# Patient Record
Sex: MALE | Race: WHITE | HISPANIC OR LATINO | Employment: UNEMPLOYED | URBAN - METROPOLITAN AREA
[De-identification: names, ages, dates, MRNs, and addresses within clinical notes are randomized per-mention and may not be internally consistent; named-entity substitution may affect disease eponyms.]

---
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
## 2022-12-20 ENCOUNTER — HOSPITAL ENCOUNTER (EMERGENCY)
Facility: HOSPITAL | Age: 22
Discharge: HOME | End: 2022-12-20
Attending: EMERGENCY MEDICINE
Payer: COMMERCIAL

## 2022-12-20 VITALS
HEIGHT: 70 IN | OXYGEN SATURATION: 98 % | BODY MASS INDEX: 37.22 KG/M2 | HEART RATE: 58 BPM | RESPIRATION RATE: 16 BRPM | DIASTOLIC BLOOD PRESSURE: 54 MMHG | WEIGHT: 260 LBS | TEMPERATURE: 99 F | SYSTOLIC BLOOD PRESSURE: 110 MMHG

## 2022-12-20 DIAGNOSIS — R11.2 NAUSEA AND VOMITING, UNSPECIFIED VOMITING TYPE: Primary | ICD-10-CM

## 2022-12-20 LAB
BILIRUB UR QL STRIP: NEGATIVE
BUN SERPL-MCNC: 12 MG/DL (ref 6–30)
CHLORIDE SERPL-SCNC: 104 MMOL/L (ref 95–110)
CLARITY UR REFRACT.AUTO: CLEAR
COLOR UR AUTO: YELLOW
CREAT SERPL-MCNC: 0.8 MG/DL (ref 0.5–1.4)
GLUCOSE SERPL-MCNC: 95 MG/DL (ref 70–110)
GLUCOSE UR QL STRIP: NEGATIVE
HCT VFR BLD CALC: 50 %PCV (ref 36–54)
HCV AB SERPL QL IA: NORMAL
HGB UR QL STRIP: NEGATIVE
HIV 1+2 AB+HIV1 P24 AG SERPL QL IA: NORMAL
INFLUENZA A, MOLECULAR: NOT DETECTED
INFLUENZA B, MOLECULAR: NOT DETECTED
KETONES UR QL STRIP: NEGATIVE
LEUKOCYTE ESTERASE UR QL STRIP: NEGATIVE
NITRITE UR QL STRIP: NEGATIVE
PH UR STRIP: 8 [PH] (ref 5–8)
POC IONIZED CALCIUM: 1.24 MMOL/L (ref 1.06–1.42)
POC TCO2 (MEASURED): 25 MMOL/L (ref 23–29)
POTASSIUM BLD-SCNC: 4.2 MMOL/L (ref 3.5–5.1)
PROT UR QL STRIP: NEGATIVE
RSV AG BY MOLECULAR METHOD: NOT DETECTED
SAMPLE: NORMAL
SARS-COV-2 RNA RESP QL NAA+PROBE: NOT DETECTED
SODIUM BLD-SCNC: 140 MMOL/L (ref 136–145)
SP GR UR STRIP: 1.02 (ref 1–1.03)
URN SPEC COLLECT METH UR: NORMAL

## 2022-12-20 PROCEDURE — 99284 EMERGENCY DEPT VISIT MOD MDM: CPT | Mod: 25

## 2022-12-20 PROCEDURE — 99284 EMERGENCY DEPT VISIT MOD MDM: CPT | Mod: CS,,, | Performed by: PHYSICIAN ASSISTANT

## 2022-12-20 PROCEDURE — 96374 THER/PROPH/DIAG INJ IV PUSH: CPT

## 2022-12-20 PROCEDURE — 96361 HYDRATE IV INFUSION ADD-ON: CPT

## 2022-12-20 PROCEDURE — 82565 ASSAY OF CREATININE: CPT

## 2022-12-20 PROCEDURE — 25000003 PHARM REV CODE 250: Performed by: PHYSICIAN ASSISTANT

## 2022-12-20 PROCEDURE — 82962 GLUCOSE BLOOD TEST: CPT

## 2022-12-20 PROCEDURE — 81003 URINALYSIS AUTO W/O SCOPE: CPT | Performed by: PHYSICIAN ASSISTANT

## 2022-12-20 PROCEDURE — 0241U SARS-COV2 (COVID) WITH FLU/RSV BY PCR: CPT | Performed by: PHYSICIAN ASSISTANT

## 2022-12-20 PROCEDURE — 63600175 PHARM REV CODE 636 W HCPCS: Performed by: PHYSICIAN ASSISTANT

## 2022-12-20 PROCEDURE — 99284 PR EMERGENCY DEPT VISIT,LEVEL IV: ICD-10-PCS | Mod: CS,,, | Performed by: PHYSICIAN ASSISTANT

## 2022-12-20 PROCEDURE — 87389 HIV-1 AG W/HIV-1&-2 AB AG IA: CPT | Performed by: EMERGENCY MEDICINE

## 2022-12-20 PROCEDURE — 86803 HEPATITIS C AB TEST: CPT | Performed by: EMERGENCY MEDICINE

## 2022-12-20 RX ORDER — ONDANSETRON 2 MG/ML
4 INJECTION INTRAMUSCULAR; INTRAVENOUS
Status: COMPLETED | OUTPATIENT
Start: 2022-12-20 | End: 2022-12-20

## 2022-12-20 RX ORDER — ONDANSETRON 4 MG/1
4 TABLET, ORALLY DISINTEGRATING ORAL EVERY 8 HOURS PRN
Qty: 12 TABLET | Refills: 0 | Status: SHIPPED | OUTPATIENT
Start: 2022-12-20

## 2022-12-20 RX ADMIN — ONDANSETRON 4 MG: 2 INJECTION INTRAMUSCULAR; INTRAVENOUS at 10:12

## 2022-12-20 RX ADMIN — SODIUM CHLORIDE 1000 ML: 0.9 INJECTION, SOLUTION INTRAVENOUS at 10:12

## 2022-12-20 NOTE — ED NOTES
Pt started at 2am with headache, body aches, chills, abd pain with tenderness to right abd region.    LOC: The patient is awake, alert and aware of environment with an appropriate affect, the patient is oriented x 3 and speaking appropriately.  APPEARANCE: Patient resting comfortably and in no acute distress, patient is clean and well groomed, patient's clothing is properly fastened.  SKIN: The skin is warm and dry, color consistent with ethnicity, patient has normal skin turgor and moist mucus membranes, skin intact, no breakdown or bruising noted.  MUSCULOSKELETAL: Patient moving all extremities spontaneously, no obvious swelling or deformities noted.  RESPIRATORY: Airway is open and patent, respirations are spontaneous, patient has a normal effort and rate, no accessory muscle use noted.  ABDOMEN: Soft and + tender to palpation, no distention noted.

## 2022-12-20 NOTE — ED NOTES
Pt ambulatory to restroom at this time, steady unassisted gait noted. Aware of need for urine sample.

## 2022-12-20 NOTE — ED PROVIDER NOTES
Encounter Date: 12/20/2022       History     Chief Complaint   Patient presents with    Vomiting    Headache     22-year-old male with no known medical history presents to the ED complaining of nausea and vomiting that started this morning around 2:00 a.m..  He reports generalized abdominal pain/discomfort, lightheadedness.  He initially had a headache but that has since significantly improved.  No known sick contacts.  He is not vaccinated for COVID or influenza.  He denies fever, chest pain, shortness breath, diarrhea, dysuria, URI symptoms.    The history is provided by the patient. The history is limited by a language barrier (Kittitian). A  was used (#[id number]).   Review of patient's allergies indicates:  No Known Allergies  No past medical history on file.  No past surgical history on file.  No family history on file.     Review of Systems   Constitutional:  Positive for chills. Negative for fever.   HENT:  Negative for congestion and sore throat.    Respiratory:  Negative for cough and shortness of breath.    Cardiovascular:  Negative for chest pain.   Gastrointestinal:  Positive for abdominal pain, nausea and vomiting. Negative for constipation and diarrhea.   Genitourinary:  Negative for dysuria and hematuria.   Musculoskeletal:  Negative for back pain.   Skin:  Negative for rash.   Neurological:  Positive for light-headedness and headaches. Negative for weakness and numbness.   Psychiatric/Behavioral:  Negative for confusion.      Physical Exam     Initial Vitals [12/20/22 0935]   BP Pulse Resp Temp SpO2   137/78 71 16 98.6 °F (37 °C) 100 %      MAP       --         Physical Exam    Nursing note and vitals reviewed.  Constitutional: He appears well-developed and well-nourished. He is not diaphoretic. No distress.   HENT:   Head: Normocephalic and atraumatic.   Neck: Neck supple.   Normal range of motion.  Cardiovascular:  Normal rate, regular rhythm and normal heart sounds.     Exam reveals  no gallop and no friction rub.       No murmur heard.  Pulmonary/Chest: Breath sounds normal. He has no wheezes. He has no rhonchi. He has no rales.   Abdominal: Abdomen is soft. Bowel sounds are normal. There is abdominal tenderness (generalized, minimal).   No right CVA tenderness.  No left CVA tenderness. There is no rebound and no guarding.   Musculoskeletal:         General: Normal range of motion.      Cervical back: Normal range of motion and neck supple.     Neurological: He is alert and oriented to person, place, and time.   Skin: Skin is warm and dry. No rash noted. No erythema.   Psychiatric: He has a normal mood and affect.       ED Course   Procedures  Labs Reviewed   URINALYSIS, REFLEX TO URINE CULTURE    Narrative:     Specimen Source->Urine   SARS-COV2 (COVID) WITH FLU/RSV BY PCR   HIV 1 / 2 ANTIBODY    Narrative:     Release to patient->Immediate   HEPATITIS C ANTIBODY    Narrative:     Release to patient->Immediate   ISTAT PROCEDURE          Imaging Results    None          Medications   sodium chloride 0.9% bolus 1,000 mL 1,000 mL (0 mLs Intravenous Stopped 12/20/22 1113)   ondansetron injection 4 mg (4 mg Intravenous Given 12/20/22 1013)     Medical Decision Making:   History:   Old Medical Records: I decided to obtain old medical records.  Clinical Tests:   Lab Tests: Ordered and Reviewed     APC / Resident Notes:   22-year-old male with no known medical history presents to the ED complaining of nausea and vomiting that started this morning around 2:00 a.m..  Vital signs stable.  Well-appearing.  Abdomen is soft, generalized tenderness to palpation.  No focal abdominal tenderness.  Regular rate and rhythm.  Lungs are clear.  No CVA tenderness.  Differential diagnosis includes but isn't limited to dehydration, acute kidney injury, electrolyte abnormality, gastroenteritis, UTI, COVID, influenza.  Will get labs, give IV fluids and Zofran and reassess.    COVID/flu/RSV negative. Chem8 unremarkable.  UA with no infection.    Symptoms improved with IVF and zofran. Tolerated po challenge. I do not feel that he needs any further labs or imaging at this time. Stable for discharge.    He was discharged with a prescription for zofran.  He will follow up with his PCP.  Strict ED return precautions given.  All of the patient's questions were answered.  I reviewed the patient's chart and labs.                   Clinical Impression:   Final diagnoses:  [R11.2] Nausea and vomiting, unspecified vomiting type (Primary)        ED Disposition Condition    Discharge Stable          ED Prescriptions       Medication Sig Dispense Start Date End Date Auth. Provider    ondansetron (ZOFRAN-ODT) 4 MG TbDL Take 1 tablet (4 mg total) by mouth every 8 (eight) hours as needed (nausea). 12 tablet 12/20/2022 -- Amy Hu PA-C          Follow-up Information       Follow up With Specialties Details Why Contact Info Additional Information    [facility]   [address]  [facility] [id number] [phone] [facility] Please park in surface lot and check in at central registration desk             Amy Hu PA-C  12/20/22 0382

## 2022-12-20 NOTE — ED NOTES
I-STAT Chem-8+ Results:   Value Reference Range   Sodium 140 136-145 mmol/L   Potassium  4.2 3.5-5.1 mmol/L   Chloride 104  mmol/L   Ionized Calcium 1.24 1.06-1.42 mmol/L   CO2 (measured) 25 23-29 mmol/L   Glucose 95  mg/dL   BUN 12 6-30 mg/dL   Creatinine 0.8 0.5-1.4 mg/dL   Hematocrit 50 36-54%

## 2024-12-30 ENCOUNTER — HOSPITAL ENCOUNTER (EMERGENCY)
Facility: HOSPITAL | Age: 24
Discharge: HOME | End: 2024-12-31
Attending: STUDENT IN AN ORGANIZED HEALTH CARE EDUCATION/TRAINING PROGRAM
Payer: COMMERCIAL

## 2024-12-30 DIAGNOSIS — M79.89 LEG SWELLING: ICD-10-CM

## 2024-12-30 PROCEDURE — 63600175 PHARM REV CODE 636 W HCPCS: Performed by: STUDENT IN AN ORGANIZED HEALTH CARE EDUCATION/TRAINING PROGRAM

## 2024-12-30 PROCEDURE — 99284 EMERGENCY DEPT VISIT MOD MDM: CPT | Mod: 25

## 2024-12-30 PROCEDURE — 25000003 PHARM REV CODE 250: Performed by: STUDENT IN AN ORGANIZED HEALTH CARE EDUCATION/TRAINING PROGRAM

## 2024-12-30 PROCEDURE — 96372 THER/PROPH/DIAG INJ SC/IM: CPT | Performed by: STUDENT IN AN ORGANIZED HEALTH CARE EDUCATION/TRAINING PROGRAM

## 2024-12-30 RX ORDER — PREDNISONE 20 MG/1
60 TABLET ORAL
Status: COMPLETED | OUTPATIENT
Start: 2024-12-30 | End: 2024-12-30

## 2024-12-30 RX ORDER — CETIRIZINE HYDROCHLORIDE 10 MG/1
40 TABLET ORAL
Status: COMPLETED | OUTPATIENT
Start: 2024-12-30 | End: 2024-12-30

## 2024-12-30 RX ORDER — DIPHENHYDRAMINE HCL 50 MG
50 CAPSULE ORAL
Status: COMPLETED | OUTPATIENT
Start: 2024-12-30 | End: 2024-12-30

## 2024-12-30 RX ORDER — FUROSEMIDE 10 MG/ML
20 INJECTION INTRAMUSCULAR; INTRAVENOUS
Status: COMPLETED | OUTPATIENT
Start: 2024-12-30 | End: 2024-12-30

## 2024-12-30 RX ADMIN — DIPHENHYDRAMINE HYDROCHLORIDE 50 MG: 50 CAPSULE ORAL at 11:12

## 2024-12-30 RX ADMIN — PREDNISONE 60 MG: 20 TABLET ORAL at 11:12

## 2024-12-30 RX ADMIN — FUROSEMIDE 20 MG: 10 INJECTION, SOLUTION INTRAMUSCULAR; INTRAVENOUS at 11:12

## 2024-12-30 RX ADMIN — CETIRIZINE HYDROCHLORIDE 40 MG: 10 TABLET, FILM COATED ORAL at 11:12

## 2024-12-30 NOTE — Clinical Note
"Graham Piña" Won Ulrichquez was seen and treated in our emergency department on 12/30/2024.  He may return to work on 01/01/2025.       If you have any questions or concerns, please don't hesitate to call.       RN    "

## 2024-12-31 VITALS
OXYGEN SATURATION: 99 % | BODY MASS INDEX: 37.22 KG/M2 | RESPIRATION RATE: 16 BRPM | DIASTOLIC BLOOD PRESSURE: 81 MMHG | WEIGHT: 260 LBS | TEMPERATURE: 99 F | HEIGHT: 70 IN | HEART RATE: 63 BPM | SYSTOLIC BLOOD PRESSURE: 125 MMHG

## 2024-12-31 RX ORDER — CETIRIZINE HYDROCHLORIDE 10 MG/1
40 TABLET ORAL DAILY
Qty: 20 TABLET | Refills: 0 | Status: SHIPPED | OUTPATIENT
Start: 2024-12-31 | End: 2025-01-05

## 2024-12-31 RX ORDER — PREDNISONE 20 MG/1
40 TABLET ORAL DAILY
Qty: 4 TABLET | Refills: 0 | Status: SHIPPED | OUTPATIENT
Start: 2024-12-31 | End: 2025-01-02

## 2024-12-31 NOTE — DISCHARGE INSTRUCTIONS
Llame para programar avelino tabby con schumacher médico de atención primaria. Use los medicamentos según lo prescrito y regrese al departamento de emergencias si la condición empeora de alguna manera.

## 2024-12-31 NOTE — ED NOTES
#[id number] used    Patient states allergy to shrimp, having swollen feet this AM. States taking no medications for the allergy after eating shrimp. States sometimes has difficulty breathing with eating shrimp, no visible distress, no accessory muscle usage, no audible wheezing, able to tolerate secretions.     in room

## 2024-12-31 NOTE — ED PROVIDER NOTES
Encounter Date: 12/30/2024       History     Chief Complaint   Patient presents with    Allergic Reaction     Accidentally ingested shrimp yesterday. Allergic to shrimp. Feels like feet are swelling up - too painful to walk. Feels SOB. Speech clear. No facial swelling. Tolerating oral secretions. No wheezing. Breathing unlabored and even. Took ASA for pain. Denies rash on feet or on body.      HPI  24-year-old male no pertinent medical history presents brought in by self through triage for isolated swelling of the right foot.  The patient is extremely adamant that all of this began 10 minutes after he accidentally ate a shrimp, which he knows he is allergic to.  This was associated with a mild generalized pruritus over the same interval.  He did not take any medications after this however, thinking it would improve on its own.  He did not take any allergic reaction related meds today.  He denies any other systemic or infectious or anaphylactoid symptoms or other acute complaints.  Review of patient's allergies indicates:   Allergen Reactions    Shrimp Swelling     History reviewed. No pertinent past medical history.  History reviewed. No pertinent surgical history.  No family history on file.  Social History     Tobacco Use    Smoking status: Never    Smokeless tobacco: Never   Substance Use Topics    Alcohol use: Never    Drug use: Never   Medical surgical family social history reviewed  Review of Systems  Complete review of systems was conducted and was negative except as per HPI and as marked  Physical Exam     Initial Vitals [12/30/24 2053]   BP Pulse Resp Temp SpO2   118/79 96 16 98.5 °F (36.9 °C) 98 %      MAP       --         Physical Exam    ED Course   Procedures  Labs Reviewed - No data to display       Imaging Results              US Lower Extremity Veins Right (Final result)  Result time 12/31/24 00:41:27      Final result by Ani Sears MD (12/31/24 00:41:27)                   Impression:      No  evidence of deep venous thrombosis in the right lower extremity.      Electronically signed by: Ani Sears MD  Date:    12/31/2024  Time:    00:41               Narrative:    EXAMINATION:  US LOWER EXTREMITY VEINS RIGHT    CLINICAL HISTORY:  Other specified soft tissue disorders    TECHNIQUE:  Duplex and color flow Doppler evaluation and graded compression of the right lower extremity veins was performed.    COMPARISON:  None    FINDINGS:  Right thigh veins: The common femoral, femoral, popliteal, upper greater saphenous, and deep femoral veins are patent and free of thrombus. The veins are normally compressible and have normal phasic flow and augmentation response.    Right calf veins: The visualized calf veins are patent.    Contralateral CFV: The contralateral (left) common femoral vein is patent and free of thrombus.    Miscellaneous: None                                       Medications   diphenhydrAMINE capsule 50 mg (50 mg Oral Given 12/30/24 2342)   cetirizine tablet 40 mg (40 mg Oral Given 12/30/24 2342)   predniSONE tablet 60 mg (60 mg Oral Given 12/30/24 2342)   furosemide injection 20 mg (20 mg Intramuscular Given 12/30/24 2342)     Medical Decision Making  Risk  OTC drugs.  Prescription drug management.    24-year-old male no pertinent medical history presents brought in by self through triage for isolated swelling of the right foot.  The patient is extremely adamant that all of this began 10 minutes after he accidentally ate a shrimp, which he knows he is allergic to.  This was associated with a mild generalized pruritus over the same interval.  He did not take any medications after this however, thinking it would improve on its own.  He did not take any allergic reaction related meds today.  He denies any other systemic or infectious or anaphylactoid symptoms or other acute complaints.               Will evaluate for acute pathology requiring intervention with appropriate studies, treat  symptomatically, and reassess.  Specifically DVT considered although very low risk.    All studies reviewed, negative for acute pathology requiring intervention.  After treatments given he feels much better.  Diagnosis, use of prescription medications, need for follow-up regarding visit today, need to call for follow-up, expected course, and return precautions were all discussed at length in my traditional manner to which patient verbalized understanding and agrees and all questions were answered. Patient is well appearing and ambulatory at time of discharge.     Nauruan language translation services used for this encounter.              Clinical Impression:  Final diagnoses:  [M79.89] Leg swelling          ED Disposition Condition    Discharge Stable          ED Prescriptions       Medication Sig Dispense Start Date End Date Auth. Provider    cetirizine (ZYRTEC) 10 MG tablet Take 4 tablets (40 mg total) by mouth once daily. for 5 days 20 tablet 12/31/2024 1/5/2025 Jose Francisco Salinas MD    predniSONE (DELTASONE) 20 MG tablet Take 2 tablets (40 mg total) by mouth once daily. for 2 days 4 tablet 12/31/2024 1/2/2025 Jose Francisco Salinas MD          Follow-up Information    None          Jose Francisco Salinas MD  12/31/24 [id number]